# Patient Record
Sex: FEMALE | Race: WHITE | Employment: PART TIME | URBAN - METROPOLITAN AREA
[De-identification: names, ages, dates, MRNs, and addresses within clinical notes are randomized per-mention and may not be internally consistent; named-entity substitution may affect disease eponyms.]

---

## 2017-05-23 ENCOUNTER — HOSPITAL ENCOUNTER (OUTPATIENT)
Dept: PHYSICAL THERAPY | Age: 57
Discharge: HOME | End: 2017-05-23
Payer: OTHER GOVERNMENT

## 2017-05-23 PROCEDURE — 97161 PT EVAL LOW COMPLEX 20 MIN: CPT | Performed by: PHYSICAL THERAPIST

## 2017-05-23 PROCEDURE — 97110 THERAPEUTIC EXERCISES: CPT | Performed by: PHYSICAL THERAPIST

## 2017-05-23 NOTE — PROGRESS NOTES
[address]  [facility]INIC [facility] [facility]  [facility]  [address], [address] ,[address], [address]  Phone: [phone]  Fax: [phone] / [address] [address] PHYSICAL THERAPY SERVICES  Patient Name: Steev Coffman : 1960   Medical   Diagnosis: Avulsion fracture of right ankle [S82.891A] Treatment Diagnosis: R Ankle Pain   Onset Date: 17     Referral Source: Janelle Bo MD [facility]): 2017   Prior Hospitalization: See medical history Provider #: [id number]   Prior Level of Function: Able to exercise at [facility] without symptoms   Comorbidities: NA   Medications: Verified on Patient Summary List   The Plan of Care and following information is based on the information from the initial evaluation.   ===========================================================================================  Assessment / key information:  48 y/o female presents to PT with right ankle swelling and pain. She reports tripping on her flip-flop by the pool and had right ankle pain/swelling. Her symptoms worsened over the next few days and she went to [facility] and was diagnosed with avulsion fracture and put into walking boot. Pt saw Dr Martin Fam on 17, and was given a lace up brace and referred for PT. Examination reveals moderate swelling at right talocrural joint. Pt has mild ROM loss into DF and has some pain at end range PF. Ankle strength is generally 4/5, and she was found to have limited proprioception on the right LE.   Signs and symptoms suggest ROM loss, swelling, pain, and limited fucntion s/p right ankle avulsion fracture.  ===========================================================================================  Eval Complexity: History MEDIUM  Complexity : 1-2 comorbidities / personal factors will impact the outcome/ POC ;  Examination  MEDIUM Complexity : 3 Standardized tests and measures addressing body structure, function, activity limitation and / or participation in recreation ; Presentation MEDIUM Complexity : Evolving with changing characteristics ; Decision Making MEDIUM Complexity : FOTO score of 26-74; Overall Complexity MEDIUM  Problem List: pain affecting function, decrease ROM, decrease strength, edema affecting function, impaired gait/ balance, decrease ADL/ functional abilitiies, decrease activity tolerance, decrease flexibility/ joint mobility and decrease transfer abilities   Treatment Plan may include any combination of the following: Therapeutic exercise, Therapeutic activities, Neuromuscular re-education, Physical agent/modality, Gait/balance training, Manual therapy, Dry Needling, Aquatic therapy, Patient education, Self Care training, Functional mobility training, Home safety training and Stair training  Patient / Family readiness to learn indicated by: asking questions, trying to perform skills and interest  Persons(s) to be included in education: patient (P)  Barriers to Learning/Limitations: None  Measures taken:    Patient Goal (s): \"get back to [facility]! \"   Patient self reported health status: good  Rehabilitation Potential: good   Short Term Goals: To be accomplished in  2  weeks:  1. Pt to regain full right ankle ROM with HEP and RICE principle for edema control. 2. Pt able to walk with equal gait components bilaterally.  Long Term Goals: To be accomplished in  6  weeks:  1. Pt to increase FOTO score from 51 to >/= 70.  2. Pt independent with high level HEP for right ankle strengthening/endurance and proprioception. 3. Pt to return to [facility] without any residual pain/swelling. Frequency / Duration:   Patient to be seen  2  times per week for 6  weeks:  Patient / Caregiver education and instruction: self care, activity modification, brace/ splint application and exercises  G-Codes (GP): darryl  Therapist Signature:  Cuate Ag PT Date: 6/53/1222   Certification Period: darryl Time: 9:49 AM   ===========================================================================================  I certify that the above Physical Therapy Services are being furnished while the patient is under my care. I agree with the treatment plan and certify that this therapy is necessary. Physician Signature:        Date:       Time:     Please sign and return to [facility] [place] or you may fax the signed copy to [phone]. Thank you.

## 2017-05-23 NOTE — PROGRESS NOTES
PHYSICAL THERAPY - DAILY TREATMENT NOTE    Patient Name: Santiago Horn        Date: 2017  : 1960   YES Patient  Verified  Visit #:      of   12  Insurance: Payor:  / Plan: Jaky Gordon RETIREES AND DEPENDENTS / Product Type:  /      In time: 10:00 Out time: 10:55   Total Treatment Time: 55     Medicare Time Tracking (below)   Total Timed Codes (min):  na 1:1 Treatment Time:  na     TREATMENT AREA =  R Ankle    SUBJECTIVE  Pain Level (on 0 to 10 scale):  0  / 10   Medication Changes/New allergies or changes in medical history, any new surgeries or procedures?     NO    If yes, update Summary List   Subjective Functional Status/Changes:  []  No changes reported     See eval/POC          OBJECTIVE  Modalities Rationale:     decrease edema, decrease inflammation and decrease pain to improve patient's ability to regain functional activity tolerance   min [] Estim, type/location:                                      []  att     []  unatt     []  w/US     []  w/ice    []  w/heat    min []  Mechanical Traction: type/lbs                   []  pro   []  sup   []  int   []  cont    []  before manual    []  after manual    min []  Ultrasound, settings/location:      min []  Iontophoresis w/ dexamethasone, location:                                               []  take home patch       []  in clinic   10 min [x]  Ice     []  Heat    location/position: R Ankle - seated after treatment    min []  Vasopneumatic Device, press/temp:     min []  Other:    [] Skin assessment post-treatment (if applicable):    []  intact    []  redness- no adverse reaction     []redness  adverse reaction:        15 min Therapeutic Exercise:  [x]  See flow sheet   Rationale:      increase ROM, increase strength, improve coordination and increase proprioception to improve the patients ability to regain functional activity tolerance        min Therapeutic Activity:         min Neuromuscular Re-ed:         min Gait Training:       min Patient Education:  YES  Reviewed HEP   []  Progressed/Changed HEP based on: Other Objective/Functional Measures:    FOTO - 51     Post Treatment Pain Level (on 0 to 10) scale:   0  / 10     ASSESSMENT  Assessment/Changes in Function:     Pt has signs and symptoms suggesting, swelling, ROM loss, and decreased proprioception s/p right ankle avulsion fracture     []  See Progress Note/Recertification   Patient will continue to benefit from skilled PT services to modify and progress therapeutic interventions, address functional mobility deficits, address ROM deficits, address strength deficits, analyze and address soft tissue restrictions, analyze and cue movement patterns, analyze and modify body mechanics/ergonomics and assess and modify postural abnormalities to attain remaining goals.    Progress toward goals / Updated goals:    Goals established today     PLAN  [x]  Upgrade activities as tolerated YES Continue plan of care   []  Discharge due to :    []  Other:      Therapist: Benita Chua PT    Date: 5/23/2017 Time: 9:48 AM     Future Appointments  Date Time Provider Christiano Raya   5/23/2017 10:00 AM Benita Chua PT [facility]

## 2017-05-25 ENCOUNTER — HOSPITAL ENCOUNTER (OUTPATIENT)
Dept: PHYSICAL THERAPY | Age: 57
Discharge: HOME | End: 2017-05-25
Payer: OTHER GOVERNMENT

## 2017-05-25 PROCEDURE — 97110 THERAPEUTIC EXERCISES: CPT

## 2017-05-25 NOTE — PROGRESS NOTES
PHYSICAL THERAPY - DAILY TREATMENT NOTE    Patient Name: Sury Singh        Date: 2017  : 1960   YES Patient  Verified  Visit #:   2   of   12  Insurance: Payor:  / Plan: Lamont Santos AND DEPENDENTS / Product Type:  /      In time: 8:30 A Out time: 9:20 A   Total Treatment Time: 50     Medicare Time Tracking (below)   Total Timed Codes (min):  NA 1:1 Treatment Time:  NA     TREATMENT AREA =  R ankle    SUBJECTIVE  Pain Level (on 0 to 10 scale):  0  / 10   Medication Changes/New allergies or changes in medical history, any new surgeries or procedures? NO    If yes, update Summary List   Subjective Functional Status/Changes:  []  No changes reported     Patient reports no new complaints since last eval, she was very sore after exercises at home but is icing.            OBJECTIVE  Modalities Rationale:     decrease edema and decrease pain to improve patient's ability to return to pain-free amb   min [] Estim, type/location:                                      []  att     []  unatt     []  w/US     []  w/ice    []  w/heat    min []  Mechanical Traction: type/lbs                   []  pro   []  sup   []  int   []  cont    []  before manual    []  after manual    min []  Ultrasound, settings/location:      min []  Iontophoresis w/ dexamethasone, location:                                               []  take home patch       []  in clinic   10 min [x]  Ice     []  Heat    location/position: longsit to R ankle     min []  Vasopneumatic Device, press/temp:     min []  Other:    [] Skin assessment post-treatment (if applicable):    []  intact    []  redness- no adverse reaction     []redness  adverse reaction:        40 min Therapeutic Exercise:  [x]  See flow sheet   Rationale:      increase ROM, increase strength and improve balance to improve the patients ability to return to pain-free amb      min Manual Therapy:    Rationale:          min Therapeutic Activity: Rationale:         min Neuromuscular Re-ed:    Rationale:       min Gait Training:    Rationale:       min Patient Education:  Jonathan Amezcua   []  Progressed/Changed HEP based on: Other Objective/Functional Measures:    Initiated TE per flow sheet  Mild signs of antalgia, v/c to normalize gait mechanics & prevent hip ER during stance     Post Treatment Pain Level (on 0 to 10) scale:   0  / 10     ASSESSMENT  Assessment/Changes in Function:     Good tolerance to initial program      []  See Progress Note/Recertification   Patient will continue to benefit from skilled PT services to modify and progress therapeutic interventions, address functional mobility deficits, address ROM deficits, address strength deficits and instruct in home and community integration to attain remaining goals.    Progress toward goals / Updated goals:    Progressing towards STG 1, 2     PLAN  [x]  Upgrade activities as tolerated YES Continue plan of care   []  Discharge due to :    []  Other:      Therapist: Mckenna Ibrahim PT    Date: 5/25/2017 Time: 10:25 AM     Future Appointments  Date Time Provider Christiano Raya   5/30/2017 10:30 AM Joelle [place]iz [place] [facility] [place]   6/1/2017 11:30 AM Mckenna Ibrahim, [place] [facility] [place]

## 2017-05-30 ENCOUNTER — HOSPITAL ENCOUNTER (OUTPATIENT)
Dept: PHYSICAL THERAPY | Age: 57
Discharge: HOME | End: 2017-05-30
Payer: OTHER GOVERNMENT

## 2017-05-30 PROCEDURE — 97110 THERAPEUTIC EXERCISES: CPT | Performed by: PHYSICAL THERAPIST

## 2017-05-30 NOTE — PROGRESS NOTES
PHYSICAL THERAPY - DAILY TREATMENT NOTE    Patient Name: Francoise Ortega        Date: 2017  : 1960   YES Patient  Verified  Visit #:   3   of   12  Insurance: Payor: ANOOP / Plan: Radha Mireles AND DEPENDENTS / Product Type:  /      In time: 10:30 Out time: 11:20   Total Treatment Time: 50     Medicare Time Tracking (below)   Total Timed Codes (min):  na 1:1 Treatment Time:  na     TREATMENT AREA =  R Ankle    SUBJECTIVE  Pain Level (on 0 to 10 scale):  0  / 10   Medication Changes/New allergies or changes in medical history, any new surgeries or procedures? NO    If yes, update Summary List   Subjective Functional Status/Changes:  []  No changes reported     Pt reports wearing compression sock and brace or boot when ever walking.           OBJECTIVE  Modalities Rationale:     decrease edema and decrease pain to improve patient's ability to return to painfree ambulation   min [] Estim, type/location:                                      []  att     []  unatt     []  w/US     []  w/ice    []  w/heat    min []  Mechanical Traction: type/lbs                   []  pro   []  sup   []  int   []  cont    []  before manual    []  after manual    min []  Ultrasound, settings/location:      min []  Iontophoresis w/ dexamethasone, location:                                               []  take home patch       []  in clinic   10 min []  Ice     [x]  Heat    location/position: Long sit to R ankle    min []  Vasopneumatic Device, press/temp:     min []  Other:    [] Skin assessment post-treatment (if applicable):    []  intact    []  redness- no adverse reaction     []redness  adverse reaction:        40 min Therapeutic Exercise:  [x]  See flow sheet   Rationale:      increase ROM, increase strength and improve balance to improve the patients ability to return to painfree ambulation      min Therapeutic Activity:         min Neuromuscular Re-ed:         min Gait Training:       min Patient Education:  YES  Reviewed HEP   []  Progressed/Changed HEP based on: Other Objective/Functional Measures:    Had pt perform R SLS with right knee extended, and she had greater difficulty maintaining balance       Post Treatment Pain Level (on 0 to 10) scale:   0  / 10     ASSESSMENT  Assessment/Changes in Function:     Pt has pain with forefoot supination and gentle mobilization of cuboid reproduces symtpoms      []  See Progress Note/Recertification   Patient will continue to benefit from skilled PT services to modify and progress therapeutic interventions, address functional mobility deficits, address ROM deficits, address strength deficits, analyze and address soft tissue restrictions, analyze and cue movement patterns, analyze and modify body mechanics/ergonomics and assess and modify postural abnormalities to attain remaining goals. Progress toward goals / Updated goals:     Will continue to progress therex for ROM and strength/proprioception as tolerated     PLAN  [x]  Upgrade activities as tolerated YES Continue plan of care   []  Discharge due to :    []  Other:      Therapist: Jesu Perez PT    Date: 5/30/2017 Time: 9:59 AM     Future Appointments  Date Time Provider Christiano Raya   5/30/2017 10:30 AM Jesu Perez PT [facility] [place]   6/1/2017 11:30 AM Junior Ferrari PT [facility] [place]

## 2017-06-01 ENCOUNTER — HOSPITAL ENCOUNTER (OUTPATIENT)
Dept: PHYSICAL THERAPY | Age: 57
Discharge: HOME | End: 2017-06-01
Payer: OTHER GOVERNMENT

## 2017-06-01 PROCEDURE — 97110 THERAPEUTIC EXERCISES: CPT

## 2017-06-01 NOTE — PROGRESS NOTES
PHYSICAL THERAPY - DAILY TREATMENT NOTE    Patient Name: Shane Garcia        Date: 2017  : 1960   YES Patient  Verified  Visit #:      of   12  Insurance: Payor:  / Plan: Lamont Santos AND DEPENDENTS / Product Type:  /      In time: 11:30 A Out time: 12:30 P   Total Treatment Time: 55/40     Medicare Time Tracking (below)   Total Timed Codes (min):  NA 1:1 Treatment Time:  NA     TREATMENT AREA =  R ankle    SUBJECTIVE  Pain Level (on 0 to 10 scale):  0  / 10   Medication Changes/New allergies or changes in medical history, any new surgeries or procedures?     NO    If yes, update Summary List   Subjective Functional Status/Changes:  []  No changes reported     See PN to MD          OBJECTIVE  Modalities Rationale:     decrease pain to improve patient's ability to return to pain-free ADLs    min [] Estim, type/location:                                      []  att     []  unatt     []  w/US     []  w/ice    []  w/heat    min []  Mechanical Traction: type/lbs                   []  pro   []  sup   []  int   []  cont    []  before manual    []  after manual    min []  Ultrasound, settings/location:      min []  Iontophoresis w/ dexamethasone, location:                                               []  take home patch       []  in clinic   10 min [x]  Ice     []  Heat    location/position: Supine to R ankle    min []  Vasopneumatic Device, press/temp:     min []  Other:    [] Skin assessment post-treatment (if applicable):    []  intact    []  redness- no adverse reaction     []redness  adverse reaction:        45/  40 min Therapeutic Exercise:  [x]  See flow sheet   Rationale:      increase ROM and increase strength to improve the patients ability to return pain-free ambulation       min Manual Therapy:    Rationale:          min Therapeutic Activity:    Rationale:         min Neuromuscular Re-ed:    Rationale:       min Gait Training:    Rationale:       min Patient Education:  YES  Reviewed HEP   []  Progressed/Changed HEP based on: Other Objective/Functional Measures:    FOTO   AROM WFL, mild ERP with IV & PF OP passively  MMT 5/5 c/o pain upon MMT of R ankle PF     Post Treatment Pain Level (on 0 to 10) scale:   0  / 10     ASSESSMENT  Assessment/Changes in Function:     Steady improvements in strength & mobility      []  See Progress Note/Recertification   Patient will continue to benefit from skilled PT services to modify and progress therapeutic interventions, address functional mobility deficits, address ROM deficits, address strength deficits, address imbalance/dizziness and instruct in home and community integration to attain remaining goals.    Progress toward goals / Updated goals:    Progressing towards LTG 3      PLAN  [x]  Upgrade activities as tolerated YES Continue plan of care   []  Discharge due to :    [x]  Other: See PN to MD     Therapist: Joseph Rivers PT    Date: 6/1/2017 Time: 12:08 PM     Future Appointments  Date Time Provider Christiano Raya   6/6/2017 9:30 AM Joseph Rivers PT [facility] [place]   6/8/2017 12:30 PM Regla Garrido PT [facility] [place]   6/13/2017 9:00 AM Joseph Rivers PT [facility] [place]   6/15/2017 11:30 AM Joseph Rivers PT [facility] [place]

## 2017-06-01 NOTE — PROGRESS NOTES
[address]  [facility]  [facility]  [address], [address] ,[address], [address]  Phone: [phone]  Fax: [phone]  PROGRESS NOTE  Patient Name: Aminata Vanessa : 1960   Treatment/Medical Diagnosis: Right ankle pain [M25.571]  Avulsion fracture of right ankle [S82.891A]   Referral Source: Patito Galloway MD     Date of Initial Visit: 17 Attended Visits: 4 Missed Visits: 0     SUMMARY OF TREATMENT  Therapeutic exercise including ROM, stretching, gentle strengthening, gait & balance training, postural ed, patient education, HEP instruction, CP. CURRENT STATUS  Patient reports generally reports no c/o pain in R ankle with primary c/o \"sorness\" in R ankle which worsens with prolonged weightbearing activities. She reports being on her feet for a total of 4 hours yesterday & although she as in her walking boot, her pain reached 4-5/10. She continues to have swelling in lateral ankle, although she believes her pain, swelling & strength have improved overall since initiation of PT. Goal/Measure of Progress Goal Met? 1.  Pt to regain full right ankle ROM with HEP and RICE principle for edema control. Status at last Eval: limited Current Status: AROM WFL all planes, mild ERP with passive IV & PF Partially met    2. Pt able to walk with equal gait components bilaterally. Status at last Eval: NA Current Status: No signs of antalgia yes   3. Pt to increase FOTO score from 51 to >/= 70. Status at last Eval: 51 Current Status: 48 yes     New Goals to be achieved in __4__  weeks:  1. Patient will initiate walk to jog program without exacerbation of sx in R ankle. 2.  Improve FOTO score from 48 points to > or = 70 points indicating improved tolerance with ADLs in regards to R knee. 3.  Improve overall strength of R ankle to 5-/5 with no c/o pain upon MMT so strength is available for return to pain-free ambulation.    4.  Patient to be independent & compliant with HEP in preparation for D/C. G-Codes (GP): NA  RECOMMENDATIONS  Patient to continue with PT for up to 3-4 weeks in order to progress towards achieving all LTGs. If you have any questions/comments please contact us directly at [phone]. Thank you for allowing us to assist in the care of your patient. Therapist Signature: SUNDAY Clark, cert MDT Date: 8/2/4342     Time: 12:08 PM   NOTE TO PHYSICIAN:  PLEASE COMPLETE THE ORDERS BELOW AND FAX TO   [facility]: [phone]. If you are unable to process this request in 24 hours please contact our office: [phone].    ___ I have read the above report and request that my patient continue as recommended.   ___ I have read the above report and request that my patient continue therapy with the following changes/special instructions:_________________________________________________________   ___ I have read the above report and request that my patient be discharged from therapy.      Physician Signature:        Date:       Time:

## 2017-06-06 ENCOUNTER — HOSPITAL ENCOUNTER (OUTPATIENT)
Dept: PHYSICAL THERAPY | Age: 57
Discharge: HOME | End: 2017-06-06
Payer: OTHER GOVERNMENT

## 2017-06-06 PROCEDURE — 97110 THERAPEUTIC EXERCISES: CPT

## 2017-06-06 NOTE — PROGRESS NOTES
PHYSICAL THERAPY - DAILY TREATMENT NOTE    Patient Name: Maximus Narayanan        Date: 2017  : 1960   YES Patient  Verified  Visit #:      of   12  Insurance: Payor:  / Plan: Lamont Santos AND DEPENDENTS / Product Type:  /      In time: 9:30 A Out time: 10:30 A   Total Treatment Time: 54     Medicare Time Tracking (below)   Total Timed Codes (min):  NA 1:1 Treatment Time:  NA     TREATMENT AREA =  R ankle    SUBJECTIVE  Pain Level (on 0 to 10 scale):  0  / 10   Medication Changes/New allergies or changes in medical history, any new surgeries or procedures? NO    If yes, update Summary List   Subjective Functional Status/Changes:  []  No changes reported     Patient reports that f/u with MD went well, they took an X-ray & she was told that she can come out of her boot. She went back to [employer] & did well with modifications.            OBJECTIVE  Modalities Rationale:     decrease edema and decrease pain to improve patient's ability to return to pain-free ambulation    min [] Estim, type/location:                                      []  att     []  unatt     []  w/US     []  w/ice    []  w/heat    min []  Mechanical Traction: type/lbs                   []  pro   []  sup   []  int   []  cont    []  before manual    []  after manual    min []  Ultrasound, settings/location:      min []  Iontophoresis w/ dexamethasone, location:                                               []  take home patch       []  in clinic   10 min [x]  Ice     []  Heat    location/position: Supine to R ankle     min []  Vasopneumatic Device, press/temp:     min []  Other:    [] Skin assessment post-treatment (if applicable):    []  intact    []  redness- no adverse reaction     []redness  adverse reaction:        45 min Therapeutic Exercise:  [x]  See flow sheet   Rationale:      increase ROM and increase strength to improve the patients ability to return to pain-free sport      min Manual Therapy:    Rationale:          min Therapeutic Activity:    Rationale:         min Neuromuscular Re-ed:    Rationale:       min Gait Training:    Rationale:       min Patient Education:  YES  Reviewed HEP   []  Progressed/Changed HEP based on: Other Objective/Functional Measures:    SLS on foam, added RB weight shifting in standing     Post Treatment Pain Level (on 0 to 10) scale:   0  / 10     ASSESSMENT  Assessment/Changes in Function:     Good tolerance to [address] progressions      []  See Progress Note/Recertification   Patient will continue to benefit from skilled PT services to modify and progress therapeutic interventions, address functional mobility deficits, address ROM deficits, address strength deficits, assess and modify postural abnormalities, address imbalance/dizziness and instruct in home and community integration to attain remaining goals.    Progress toward goals / Updated goals:    Progressing towards LTG 2, 3     PLAN  [x]  Upgrade activities as tolerated YES Continue plan of care   []  Discharge due to :    []  Other:      Therapist: Rebecca South PT    Date: 6/6/2017 Time: 12:29 PM     Future Appointments  Date Time Provider Christiano Raya   6/8/2017 12:30 PM Colleen Marin PT [facility]   6/13/2017 9:00 AM Rebecca South PT [facility]   6/15/2017 11:30 AM Rebecca South PT [facility]

## 2017-06-08 ENCOUNTER — HOSPITAL ENCOUNTER (OUTPATIENT)
Dept: PHYSICAL THERAPY | Age: 57
Discharge: HOME | End: 2017-06-08
Payer: OTHER GOVERNMENT

## 2017-06-08 PROCEDURE — 97110 THERAPEUTIC EXERCISES: CPT | Performed by: PHYSICAL THERAPIST

## 2017-06-08 NOTE — PROGRESS NOTES
PHYSICAL THERAPY - DAILY TREATMENT NOTE    Patient Name: Halle López        Date: 2017  : 1960   YES Patient  Verified  Visit #:     Insurance: Payor:  / Plan: [address] DEPENDENTS / Product Type:  /      In time: 12:35 Out time: 1:25   Total Treatment Time: 50     Medicare Time Tracking (below)   Total Timed Codes (min):  na 1:1 Treatment Time:  na     TREATMENT AREA =  R Ankle    SUBJECTIVE  Pain Level (on 0 to 10 scale):  0  / 10   Medication Changes/New allergies or changes in medical history, any new surgeries or procedures? NO    If yes, update Summary List   Subjective Functional Status/Changes:  []  No changes reported     Pt reports doing a lot of yardwork yesterday and has had no pain or soreness.           OBJECTIVE  Modalities Rationale:     decrease edema, decrease inflammation and decrease pain to improve patient's ability to prevent soreness   min [] Estim, type/location:                                      []  att     []  unatt     []  w/US     []  w/ice    []  w/heat    min []  Mechanical Traction: type/lbs                   []  pro   []  sup   []  int   []  cont    []  before manual    []  after manual    min []  Ultrasound, settings/location:      min []  Iontophoresis w/ dexamethasone, location:                                               []  take home patch       []  in clinic   10 min [x]  Ice     []  Heat    location/position: R ankle - seated after treatment    min []  Vasopneumatic Device, press/temp:     min []  Other:    [] Skin assessment post-treatment (if applicable):    []  intact    []  redness- no adverse reaction     []redness  adverse reaction:        40 min Therapeutic Exercise:  [x]  See flow sheet   Rationale:      increase ROM, increase strength, improve coordination and increase proprioception to improve the patients ability to increase activity tolerance        min Therapeutic Activity:         min Neuromuscular Re-ed:         min Gait Training:       min Patient Education:  YES  Reviewed HEP   []  Progressed/Changed HEP based on: Other Objective/Functional Measures:    Pt tolerated simulated  exercise that she skipped at [facility] last sesison     Post Treatment Pain Level (on 0 to 10) scale:   0  / 10     ASSESSMENT  Assessment/Changes in Function:     Pt still showing some right ankle proprioception deficits with high er level movements,and was encouraged to work on these at home to challenge stability without the pressures of being in class. []  See Progress Note/Recertification   Patient will continue to benefit from skilled PT services to modify and progress therapeutic interventions, address functional mobility deficits, address ROM deficits, address strength deficits, analyze and address soft tissue restrictions, analyze and cue movement patterns and analyze and modify body mechanics/ergonomics to attain remaining goals. Progress toward goals / Updated goals: Will continue to progress strengthening and proprioception to allow return to all activities without requiring brace.      PLAN  [x]  Upgrade activities as tolerated YES Continue plan of care   []  Discharge due to :    []  Other:      Therapist: Santos Pack PT    Date: 6/8/2017 Time: 12:13 PM     Future Appointments  Date Time Provider Christiano Raya   6/8/2017 12:30 PM Santos Pack, ROS[facility] [place]   6/13/2017 9:00 AM Elliott Wong, PT [facility] [place]   6/15/2017 11:30 AM Elliott Wong, PT [facility] [place]

## 2017-06-13 ENCOUNTER — HOSPITAL ENCOUNTER (OUTPATIENT)
Dept: PHYSICAL THERAPY | Age: 57
Discharge: HOME | End: 2017-06-13
Payer: OTHER GOVERNMENT

## 2017-06-13 PROCEDURE — 97110 THERAPEUTIC EXERCISES: CPT

## 2017-06-13 NOTE — PROGRESS NOTES
PHYSICAL THERAPY - DAILY TREATMENT NOTE    Patient Name: Maye Blanton        Date: 2017  : 1960   YES Patient  Verified  Visit #:     Insurance: Payor:  / Plan: Lamont Santos AND DEPENDENTS / Product Type:  /      In time: 9:00 A Out time: 9:55 A   Total Treatment Time: 54     Medicare Time Tracking (below)   Total Timed Codes (min):  NA 1:1 Treatment Time:  NA     TREATMENT AREA =  R ankle    SUBJECTIVE  Pain Level (on 0 to 10 scale):  0  / 10   Medication Changes/New allergies or changes in medical history, any new surgeries or procedures?     NO    If yes, update Summary List   Subjective Functional Status/Changes:  []  No changes reported     Patient reports that she has been able to workout, she has modified her workouts & is not running on the [email or web address], but was able to do lunges & the rower machine           OBJECTIVE  Modalities Rationale:     decrease edema and decrease pain to improve patient's ability to return to pain-free sport   min [] Estim, type/location:                                      []  att     []  unatt     []  w/US     []  w/ice    []  w/heat    min []  Mechanical Traction: type/lbs                   []  pro   []  sup   []  int   []  cont    []  before manual    []  after manual    min []  Ultrasound, settings/location:      min []  Iontophoresis w/ dexamethasone, location:                                               []  take home patch       []  in clinic   10 min [x]  Ice     []  Heat    location/position: longsit to R ankle    min []  Vasopneumatic Device, press/temp:     min []  Other:    [] Skin assessment post-treatment (if applicable):    []  intact    []  redness- no adverse reaction     []redness  adverse reaction:        45 min Therapeutic Exercise:  [x]  See flow sheet   Rationale:      increase ROM and increase strength to improve the patients ability to return to eventual running      min Manual Therapy:    Rationale: min Therapeutic Activity:    Rationale:         min Neuromuscular Re-ed:    Rationale:       min Gait Training:    Rationale:       min Patient Education:  YES  Reviewed HEP   []  Progressed/Changed HEP based on: Other Objective/Functional Measures:    TE per flow sheet      Post Treatment Pain Level (on 0 to 10) scale:   0  / 10     ASSESSMENT  Assessment/Changes in Function:     Good tolerance to today's treatment, held mini squats per patient request, unrelated to R ankle sx      []  See Progress Note/Recertification   Patient will continue to benefit from skilled PT services to modify and progress therapeutic interventions, address functional mobility deficits, address ROM deficits, address strength deficits, assess and modify postural abnormalities and instruct in home and community integration to attain remaining goals.    Progress toward goals / Updated goals:    Progressing towards LTG 1, 2     PLAN  [x]  Upgrade activities as tolerated YES Continue plan of care   []  Discharge due to :    []  Other:      Therapist: Louis Omalley PT    Date: 6/13/2017 Time: 12:25 PM     Future Appointments  Date Time Provider Christiano Raya   6/15/2017 9:00 AM Louis Omalley PT [facility] [place]

## 2017-06-15 ENCOUNTER — HOSPITAL ENCOUNTER (OUTPATIENT)
Dept: PHYSICAL THERAPY | Age: 57
Discharge: HOME | End: 2017-06-15
Payer: OTHER GOVERNMENT

## 2017-06-15 PROCEDURE — 97110 THERAPEUTIC EXERCISES: CPT

## 2017-06-15 NOTE — PROGRESS NOTES
PHYSICAL THERAPY - DAILY TREATMENT NOTE    Patient Name: Yaneth Lugo        Date: 6/15/2017  : 1960   YES Patient  Verified  Visit #:   8   of   12  Insurance: Payor:  / Plan: Lamont Santos AND DEPENDENTS / Product Type:  /      In time: 9:00 A Out time: 9:57 A   Total Treatment Time: 55/40     Medicare Time Tracking (below)   Total Timed Codes (min):  NA 1:1 Treatment Time:  NA     TREATMENT AREA =  R ankle    SUBJECTIVE  Pain Level (on 0 to 10 scale):  0  / 10   Medication Changes/New allergies or changes in medical history, any new surgeries or procedures? NO    If yes, update Summary List   Subjective Functional Status/Changes:  []  No changes reported     Patient reports no new complaints, doing well with workouts, no c/o pain.            OBJECTIVE  Modalities Rationale:     decrease pain to improve patient's ability to return to pain-free sport   min [] Estim, type/location:                                      []  att     []  unatt     []  w/US     []  w/ice    []  w/heat    min []  Mechanical Traction: type/lbs                   []  pro   []  sup   []  int   []  cont    []  before manual    []  after manual    min []  Ultrasound, settings/location:      min []  Iontophoresis w/ dexamethasone, location:                                               []  take home patch       []  in clinic   10 min [x]  Ice     []  Heat    location/position: longsit to R ankle     min []  Vasopneumatic Device, press/temp:     min []  Other:    [] Skin assessment post-treatment (if applicable):    []  intact    []  redness- no adverse reaction     []redness  adverse reaction:        45/  40 min Therapeutic Exercise:  [x]  See flow sheet   Rationale:      increase ROM and increase strength to improve the patients ability to return to eventual running      min Manual Therapy:    Rationale:          min Therapeutic Activity:    Rationale:         min Neuromuscular Re-ed:    Rationale: min Gait Training:    Rationale:       min Patient Education:  YES  Reviewed HEP   []  Progressed/Changed HEP based on: Other Objective/Functional Measures: Added S/L hop on TG @ L2     Post Treatment Pain Level (on 0 to 10) scale:   0  / 10     ASSESSMENT  Assessment/Changes in Function:     Good tolerance to trial of gentle S/L plyometric activities     []  See Progress Note/Recertification   Patient will continue to benefit from skilled PT services to modify and progress therapeutic interventions, address functional mobility deficits, address ROM deficits, address strength deficits, address imbalance/dizziness and instruct in home and community integration to attain remaining goals.    Progress toward goals / Updated goals:    Progressing towards LTG 1, 2     PLAN  [x]  Upgrade activities as tolerated YES Continue plan of care   []  Discharge due to :    []  Other:      Therapist: Mckenna Ibrahim PT    Date: 6/15/2017 Time: 10:22 AM     Future Appointments  Date Time Provider Christiano Raya   6/20/2017 11:00 AM Joelle Resendiz PT [facility] [place]   6/22/2017 10:00 AM Mckenna Ibrahim, PT [facility] [place]   6/27/2017 10:30 AM Mckenna Ibrahim PT [facility] [place]   6/29/2017 11:00 AM Mckenna Ibrahim, PT [facility] [place]

## 2017-06-20 ENCOUNTER — HOSPITAL ENCOUNTER (OUTPATIENT)
Dept: PHYSICAL THERAPY | Age: 57
Discharge: HOME | End: 2017-06-20
Payer: OTHER GOVERNMENT

## 2017-06-20 PROCEDURE — 97110 THERAPEUTIC EXERCISES: CPT | Performed by: PHYSICAL THERAPIST

## 2017-06-20 NOTE — PROGRESS NOTES
PHYSICAL THERAPY - DAILY TREATMENT NOTE    Patient Name: Jarred Daley        Date: 2017  : 1960   YES Patient  Verified  Visit #:     Insurance: Payor: ANOOP / Plan: [employer] AND DEPENDENTS / Product Type:  /      In time: 11:00 Out time: 11:55   Total Treatment Time: 55     Medicare Time Tracking (below)   Total Timed Codes (min):  na 1:1 Treatment Time:  na     TREATMENT AREA =  R Ankle    SUBJECTIVE  Pain Level (on 0 to 10 scale):  0  / 10   Medication Changes/New allergies or changes in medical history, any new surgeries or procedures? NO    If yes, update Summary List   Subjective Functional Status/Changes:  []  No changes reported     Pt reports doing her [employer] workouts without pain or residual soreness/swellling, but always wears her ankle brace. .          OBJECTIVE  Modalities Rationale:     decrease inflammation and decrease pain to improve patient's ability to prevent soreness   min [] Estim, type/location:                                      []  att     []  unatt     []  w/US     []  w/ice    []  w/heat    min []  Mechanical Traction: type/lbs                   []  pro   []  sup   []  int   []  cont    []  before manual    []  after manual    min []  Ultrasound, settings/location:      min []  Iontophoresis w/ dexamethasone, location:                                               []  take home patch       []  in clinic   10 min [x]  Ice     []  Heat    location/position: R ankle - seated after treatment    min []  Vasopneumatic Device, press/temp:     min []  Other:    [] Skin assessment post-treatment (if applicable):    []  intact    []  redness- no adverse reaction     []redness  adverse reaction:        45 min Therapeutic Exercise:  [x]  See flow sheet   Rationale:      increase ROM, increase strength, improve coordination and increase proprioception to improve the patients ability to increase exercise tolerance without pain min Therapeutic Activity:         min Neuromuscular Re-ed:         min Gait Training:       min Patient Education:  YES  Reviewed HEP   []  Progressed/Changed HEP based on: Other Objective/Functional Measures:    Pt able to walk_>fast walk->jog in clinic for short distances (without brace) without any soreness or pain. Post Treatment Pain Level (on 0 to 10) scale:   0  / 10     ASSESSMENT  Assessment/Changes in Function:     Pt encouraged to gradually resume jogging times on treadmill as tolerated. She will also try and wean form brace when jogging on treadmill. []  See Progress Note/Recertification   Patient will continue to benefit from skilled PT services to modify and progress therapeutic interventions, address functional mobility deficits, address ROM deficits, address strength deficits, analyze and address soft tissue restrictions, analyze and cue movement patterns, analyze and modify body mechanics/ergonomics and assess and modify postural abnormalities to attain remaining goals. Progress toward goals / Updated goals: Will monitor exercise progression, and progress toward all LTGs and DC criteria.      PLAN  [x]  Upgrade activities as tolerated YES Continue plan of care   []  Discharge due to :    []  Other:      Therapist: Wing Chen PT    Date: 6/20/2017 Time: 9:37 AM     Future Appointments  Date Time Provider Christiano Raya   6/20/2017 11:00 AM Wing Gustavo [place] [facility] [place]   6/22/2017 10:00 AM Louis Omalley PT [facility] [place]   6/27/2017 10:30 AM Louis Omalley PT [facility] [place]   6/29/2017 11:00 AM Louis Omalley PT [facility] [place]

## 2017-06-22 ENCOUNTER — HOSPITAL ENCOUNTER (OUTPATIENT)
Dept: PHYSICAL THERAPY | Age: 57
Discharge: HOME | End: 2017-06-22
Payer: OTHER GOVERNMENT

## 2017-06-22 PROCEDURE — 97110 THERAPEUTIC EXERCISES: CPT

## 2017-06-22 NOTE — PROGRESS NOTES
PHYSICAL THERAPY - DAILY TREATMENT NOTE    Patient Name: Shane Garcia        Date: 2017  : 1960   YES Patient  Verified  Visit #:   10   of   12  Insurance: Payor:  / Plan: [address] DEPENDENTS / Product Type:  /      In time: 10 A Out time: 11 A   Total Treatment Time: 54     Medicare Time Tracking (below)   Total Timed Codes (min):  NA 1:1 Treatment Time:  NA     TREATMENT AREA =  R ankle    SUBJECTIVE  Pain Level (on 0 to 10 scale):  0   / 10   Medication Changes/New allergies or changes in medical history, any new surgeries or procedures? NO    If yes, update Summary List   Subjective Functional Status/Changes:  []  No changes reported     Patient reports no new complaints since last treatment, she was able to do a run/row workout at [employer].            OBJECTIVE  Modalities Rationale:     decrease pain to improve patient's ability to return to pain-free running   min [] Estim, type/location:                                      []  att     []  unatt     []  w/US     []  w/ice    []  w/heat    min []  Mechanical Traction: type/lbs                   []  pro   []  sup   []  int   []  cont    []  before manual    []  after manual    min []  Ultrasound, settings/location:      min []  Iontophoresis w/ dexamethasone, location:                                               []  take home patch       []  in clinic   10 min [x]  Ice     []  Heat    location/position: R ankle in longsit     min []  Vasopneumatic Device, press/temp:     min []  Other:    [] Skin assessment post-treatment (if applicable):    []  intact    []  redness- no adverse reaction     []redness  adverse reaction:        45 min Therapeutic Exercise:  [x]  See flow sheet   Rationale:      increase ROM and increase strength to improve the patients ability to return to pain-free sport      min Manual Therapy:    Rationale:          min Therapeutic Activity:    Rationale:         min Neuromuscular Re-ed:    Rationale:       min Gait Training:    Rationale:       min Patient Education:  YES  Reviewed HEP   []  Progressed/Changed HEP based on: Other Objective/Functional Measures:    TM: walk to jog  (walking/baseline speed: 3.8 mph, worked up to 4.8 mph jog)  1:1, 1:2 min, 1:3 min walk/jog)     Post Treatment Pain Level (on 0 to 10) scale:   0  / 10     ASSESSMENT  Assessment/Changes in Function:     Good tolerance with initiation of walk to jog     []  See Progress Note/Recertification   Patient will continue to benefit from skilled PT services to modify and progress therapeutic interventions, address functional mobility deficits, address ROM deficits, address strength deficits, address imbalance/dizziness and instruct in home and community integration to attain remaining goals.    Progress toward goals / Updated goals:    Progressing towards LTGs     PLAN  [x]  Upgrade activities as tolerated YES Continue plan of care   []  Discharge due to :    []  Other:      Therapist: Luis Alfredo Hardy PT    Date: 6/22/2017 Time: 12:36 PM     Future Appointments  Date Time Provider Christiano Raya   6/27/2017 10:30 AM Luis Alfredo Hardy PT [facility] [place]   6/29/2017 11:00 AM Luis Alfredo Hardy PT [facility] [place]

## 2017-06-27 ENCOUNTER — HOSPITAL ENCOUNTER (OUTPATIENT)
Dept: PHYSICAL THERAPY | Age: 57
Discharge: HOME | End: 2017-06-27
Payer: OTHER GOVERNMENT

## 2017-06-27 PROCEDURE — 97110 THERAPEUTIC EXERCISES: CPT

## 2017-06-27 NOTE — PROGRESS NOTES
PHYSICAL THERAPY - DAILY TREATMENT NOTE    Patient Name: Alva Marquez        Date: 2017  : 1960   YES Patient  Verified  Visit #:     Insurance: Payor: ANOOP / Plan: [employer] DEPENDENTS / Product Type:  /      In time: 10:30 A Out time: 11:30 A   Total Treatment Time: 55     Medicare Time Tracking (below)   Total Timed Codes (min):  NA 1:1 Treatment Time:  NA     TREATMENT AREA =  R ankle    SUBJECTIVE  Pain Level (on 0 to 10 scale):  0  / 10   Medication Changes/New allergies or changes in medical history, any new surgeries or procedures?     NO    If yes, update Summary List   Subjective Functional Status/Changes:  []  No changes reported     See DC summary           OBJECTIVE  Modalities Rationale:     decrease pain to improve patient's ability to return to unlimited fitness activities   min [] Estim, type/location:                                      []  att     []  unatt     []  w/US     []  w/ice    []  w/heat    min []  Mechanical Traction: type/lbs                   []  pro   []  sup   []  int   []  cont    []  before manual    []  after manual    min []  Ultrasound, settings/location:      min []  Iontophoresis w/ dexamethasone, location:                                               []  take home patch       []  in clinic   10 min X  Ice     []  Heat    location/position: Supine to R ankle    min []  Vasopneumatic Device, press/temp:     min []  Other:    [] Skin assessment post-treatment (if applicable):    []  intact    []  redness- no adverse reaction     []redness  adverse reaction:        45 min Therapeutic Exercise:  [x]  See flow sheet   Rationale:      increase ROM, increase strength, improve balance and increase proprioception to improve the patients ability to return to unlimited sport      min Manual Therapy:    Rationale:          min Therapeutic Activity:    Rationale:         min Neuromuscular Re-ed:    Rationale:       min Gait Training:    Rationale:       min Patient Education:  YES  Reviewed HEP   []  Progressed/Changed HEP based on: Other Objective/Functional Measures:    FOTO     Post Treatment Pain Level (on 0 to 10) scale:   0  / 10     ASSESSMENT  Assessment/Changes in Function:     Pt indep with program      []  See Progress Note/Recertification   Patient will continue to benefit from skilled PT services to instruct in home and community integration to attain remaining goals. Progress toward goals / Updated goals: All goals met     PLAN  []  Upgrade activities as tolerated NO Continue plan of care   [x]  Discharge due to : All goals met   []  Other:      Therapist: Lacy Atkinson PT    Date: 6/27/2017 Time: 2:14 PM     No future appointments.

## 2017-06-29 ENCOUNTER — APPOINTMENT (OUTPATIENT)
Dept: PHYSICAL THERAPY | Age: 57
End: 2017-06-29
Payer: OTHER GOVERNMENT